# Patient Record
Sex: FEMALE | Race: WHITE | NOT HISPANIC OR LATINO | URBAN - METROPOLITAN AREA
[De-identification: names, ages, dates, MRNs, and addresses within clinical notes are randomized per-mention and may not be internally consistent; named-entity substitution may affect disease eponyms.]

---

## 2020-07-25 ENCOUNTER — TELEPHONE ENCOUNTER (OUTPATIENT)
Dept: URBAN - METROPOLITAN AREA CLINIC 13 | Facility: CLINIC | Age: 35
End: 2020-07-25

## 2020-07-25 RX ORDER — FERROUS SULFATE 325(65) MG
TAKE 1 TABLET DAILY TABLET ORAL
Qty: 90 | Refills: 3 | OUTPATIENT
End: 2019-08-27

## 2020-07-25 RX ORDER — PANTOPRAZOLE SODIUM 40 MG
TAKE 1 TABLET BY MOUTH ONCE DAILY TABLET, DELAYED RELEASE (ENTERIC COATED) ORAL
Qty: 30 | Refills: 5 | OUTPATIENT
End: 2019-04-04

## 2020-07-25 RX ORDER — TRAMADOL HYDROCHLORIDE 50 MG/1
TAKE 1 TABLET BY MOUTH EVERY 6 HOURS AS NEEDED FOR PAIN TABLET ORAL
Qty: 20 | Refills: 0 | OUTPATIENT
End: 2019-08-27

## 2020-07-25 RX ORDER — CYCLOBENZAPRINE HYDROCHLORIDE 10 MG/1
TAKE 1 TABLET AT BEDTIME AS NEEDED TABLET, FILM COATED ORAL
Refills: 0 | OUTPATIENT
Start: 2018-11-13 | End: 2019-08-27

## 2020-07-25 RX ORDER — LIDOCAINE 5 G/100G
APPLY TO AFFECTED AREAS AS DIRECTED OINTMENT TOPICAL
Refills: 0 | OUTPATIENT
Start: 2018-06-11 | End: 2020-03-12

## 2020-07-25 RX ORDER — TRAZODONE HYDROCHLORIDE 50 MG/1
TAKE 1 TABLET AT BEDTIME AS NEEDED FOR SLEEP TABLET ORAL
Refills: 0 | OUTPATIENT
End: 2019-01-21

## 2020-07-25 RX ORDER — OXYCODONE AND ACETAMINOPHEN 10; 325 MG/1; MG/1
TAKE 1 TO 2 TABLETS EVERY 4 TO 6 HOURS AS NEEDED FOR PAIN TABLET ORAL
Refills: 0 | OUTPATIENT
Start: 2018-12-11 | End: 2019-04-04

## 2020-07-26 ENCOUNTER — TELEPHONE ENCOUNTER (OUTPATIENT)
Dept: URBAN - METROPOLITAN AREA CLINIC 13 | Facility: CLINIC | Age: 35
End: 2020-07-26

## 2020-07-26 RX ORDER — CIPROFLOXACIN HYDROCHLORIDE 500 MG/1
TABLET, FILM COATED ORAL
Qty: 28 | Refills: 0 | Status: ACTIVE | COMMUNITY
Start: 2019-01-03

## 2020-07-26 RX ORDER — ALPRAZOLAM 1 MG/1
TK ONE T PO 1 HOUR PRIOR TO DENTAL APPT AND BRING REMAINING TS TO APPT TABLET ORAL
Qty: 3 | Refills: 0 | Status: ACTIVE | COMMUNITY
Start: 2019-05-02

## 2020-07-26 RX ORDER — BETAMETHASONE DIPROPIONATE 0.5 MG/G
APP EXT AA BID CREAM TOPICAL
Qty: 30 | Refills: 0 | Status: ACTIVE | COMMUNITY
Start: 2018-09-10

## 2020-07-26 RX ORDER — OXYCODONE AND ACETAMINOPHEN 5; 325 MG/1; MG/1
TABLET ORAL
Qty: 25 | Refills: 0 | Status: ACTIVE | COMMUNITY
Start: 2019-01-24

## 2020-07-26 RX ORDER — MULTIVITAMIN
TAKE 1 TABLET DAILY TABLET ORAL
Refills: 0 | Status: ACTIVE | COMMUNITY

## 2020-07-26 RX ORDER — NORETHINDRONE 0.35 MG/1
TABLET ORAL
Qty: 28 | Refills: 0 | Status: ACTIVE | COMMUNITY
Start: 2018-07-25

## 2020-07-26 RX ORDER — METRONIDAZOLE 500 MG/1
TABLET ORAL
Qty: 42 | Refills: 0 | Status: ACTIVE | COMMUNITY
Start: 2018-12-12

## 2020-07-26 RX ORDER — CIPROFLOXACIN HYDROCHLORIDE 500 MG/1
TABLET, FILM COATED ORAL
Qty: 28 | Refills: 0 | Status: ACTIVE | COMMUNITY
Start: 2018-11-20

## 2020-07-26 RX ORDER — FLUCONAZOLE 150 MG/1
TK 1 T PO QD TABLET ORAL
Qty: 7 | Refills: 0 | Status: ACTIVE | COMMUNITY
Start: 2018-10-23

## 2020-07-26 RX ORDER — ONDANSETRON 8 MG/1
TABLET, ORALLY DISINTEGRATING ORAL
Qty: 40 | Refills: 0 | Status: ACTIVE | COMMUNITY
Start: 2018-12-12

## 2020-07-26 RX ORDER — ESCITALOPRAM 10 MG/1
TAKE 1 TABLET DAILY TABLET, FILM COATED ORAL
Refills: 0 | Status: ACTIVE | COMMUNITY
Start: 2019-08-26

## 2020-07-26 RX ORDER — PANTOPRAZOLE SODIUM 40 MG/1
TABLET, DELAYED RELEASE ORAL
Qty: 30 | Refills: 0 | Status: ACTIVE | COMMUNITY
Start: 2018-07-16

## 2020-07-26 RX ORDER — SULFAMETHOXAZOLE AND TRIMETHOPRIM 800; 160 MG/1; MG/1
TK 1 T PO BID TABLET ORAL
Qty: 20 | Refills: 0 | Status: ACTIVE | COMMUNITY
Start: 2020-01-17

## 2020-07-26 RX ORDER — CIPROFLOXACIN HYDROCHLORIDE 500 MG/1
TABLET, FILM COATED ORAL
Qty: 28 | Refills: 0 | Status: ACTIVE | COMMUNITY
Start: 2019-03-26

## 2020-07-26 RX ORDER — PANTOPRAZOLE SODIUM 40 MG/1
TAKE 1 TABLET 30 MINUTES BEFORE BREAKFAST DAILY TABLET, DELAYED RELEASE ORAL
Qty: 90 | Refills: 3 | Status: ACTIVE | COMMUNITY
Start: 2020-03-12

## 2020-07-26 RX ORDER — TRAMADOL HYDROCHLORIDE 50 MG/1
TK 1 T PO  Q 6 H TABLET ORAL
Qty: 50 | Refills: 0 | Status: ACTIVE | COMMUNITY
Start: 2018-11-20

## 2020-07-26 RX ORDER — SULFAMETHOXAZOLE AND TRIMETHOPRIM 800; 160 MG/1; MG/1
TK 1 T PO  BID TABLET ORAL
Qty: 14 | Refills: 0 | Status: ACTIVE | COMMUNITY
Start: 2018-10-29

## 2020-07-26 RX ORDER — ONDANSETRON 4 MG/1
TABLET, ORALLY DISINTEGRATING ORAL
Qty: 40 | Refills: 0 | Status: ACTIVE | COMMUNITY
Start: 2019-02-12

## 2020-07-26 RX ORDER — LEVOMEFOLATE CALCIUM 7.5 MG
TAKE 1 TABLET DAILY TABLET ORAL
Refills: 0 | Status: ACTIVE | COMMUNITY

## 2020-07-26 RX ORDER — METRONIDAZOLE 500 MG/1
TABLET ORAL
Qty: 42 | Refills: 0 | Status: ACTIVE | COMMUNITY
Start: 2018-11-20

## 2020-07-26 RX ORDER — AZATHIOPRINE 50 1/1
TAKE 1 TABLET DAILY TABLET ORAL
Qty: 90 | Refills: 2 | Status: ACTIVE | COMMUNITY
Start: 2019-04-04

## 2020-07-26 RX ORDER — ADALIMUMAB 40MG/0.4ML
KIT SUBCUTANEOUS
Qty: 2 | Refills: 0 | Status: ACTIVE | COMMUNITY
Start: 2019-02-08

## 2020-07-26 RX ORDER — SERTRALINE 25 MG/1
TK 1 T PO QD TABLET, FILM COATED ORAL
Qty: 30 | Refills: 0 | Status: ACTIVE | COMMUNITY
Start: 2019-06-18

## 2020-07-26 RX ORDER — METRONIDAZOLE 7.5 MG/G
IVB  FOR 5 NIGHTS GEL VAGINAL
Qty: 70 | Refills: 0 | Status: ACTIVE | COMMUNITY
Start: 2018-10-29

## 2020-07-26 RX ORDER — SERTRALINE 50 MG/1
TABLET, FILM COATED ORAL
Qty: 90 | Refills: 0 | Status: ACTIVE | COMMUNITY
Start: 2019-07-31

## 2020-07-26 RX ORDER — ALPRAZOLAM 0.5 MG/1
TABLET ORAL
Qty: 30 | Refills: 0 | Status: ACTIVE | COMMUNITY
Start: 2019-06-18

## 2020-07-26 RX ORDER — GABAPENTIN 600 MG/1
TAKE 1 TABLET 3 TIMES DAILY TABLET, FILM COATED ORAL
Qty: 90 | Refills: 3 | Status: ACTIVE | COMMUNITY
Start: 2019-11-12

## 2020-07-26 RX ORDER — METRONIDAZOLE 500 MG/1
TK 1 T PO  BID TABLET ORAL
Qty: 42 | Refills: 0 | Status: ACTIVE | COMMUNITY
Start: 2019-01-03

## 2020-07-26 RX ORDER — DESONIDE 0.5 MG/G
OINTMENT TOPICAL
Qty: 60 | Refills: 0 | Status: ACTIVE | COMMUNITY
Start: 2019-07-12

## 2020-07-26 RX ORDER — ADALIMUMAB 40MG/0.4ML
MAINTENANCE DOSE: ONE 40 MG SQ EVERY OTHER WEEK KIT SUBCUTANEOUS
Qty: 2 | Refills: 6 | Status: ACTIVE | COMMUNITY
Start: 2019-02-06

## 2020-07-26 RX ORDER — HYDROCODONE BITARTRATE AND ACETAMINOPHEN 5; 325 MG/1; MG/1
TABLET ORAL
Qty: 16 | Refills: 0 | Status: ACTIVE | COMMUNITY
Start: 2019-05-02

## 2020-07-26 RX ORDER — CIPROFLOXACIN HYDROCHLORIDE 500 MG/1
TABLET, FILM COATED ORAL
Qty: 28 | Refills: 0 | Status: ACTIVE | COMMUNITY
Start: 2018-12-12

## 2020-07-26 RX ORDER — AMOXICILLIN 500 MG/1
CAPSULE ORAL
Qty: 21 | Refills: 0 | Status: ACTIVE | COMMUNITY
Start: 2019-05-02

## 2020-07-26 RX ORDER — LIDOCAINE 5 G/100G
APP TO PERIANAL REGION BID PRN OINTMENT TOPICAL
Qty: 30 | Refills: 0 | Status: ACTIVE | COMMUNITY
Start: 2018-06-11

## 2020-07-26 RX ORDER — CYCLOBENZAPRINE HYDROCHLORIDE 10 MG/1
TK 1 T PO TID TABLET, FILM COATED ORAL
Qty: 90 | Refills: 0 | Status: ACTIVE | COMMUNITY
Start: 2018-11-13

## 2020-08-07 ENCOUNTER — TELEPHONE ENCOUNTER (OUTPATIENT)
Dept: URBAN - METROPOLITAN AREA CLINIC 113 | Facility: CLINIC | Age: 35
End: 2020-08-07

## 2020-10-06 ENCOUNTER — ERX REFILL RESPONSE (OUTPATIENT)
Age: 35
End: 2020-10-06

## 2020-10-06 ENCOUNTER — TELEPHONE ENCOUNTER (OUTPATIENT)
Dept: URBAN - METROPOLITAN AREA CLINIC 113 | Facility: CLINIC | Age: 35
End: 2020-10-06

## 2020-10-06 PROBLEM — 444548001: Status: ACTIVE | Noted: 2020-10-06

## 2020-10-06 RX ORDER — AZATHIOPRINE 50 1/1
TAKE 1 TABLET BY MOUTH DAILY TABLET ORAL
Qty: 90 | Refills: 0

## 2023-03-21 ENCOUNTER — WEB ENCOUNTER (OUTPATIENT)
Dept: URBAN - METROPOLITAN AREA CLINIC 113 | Facility: CLINIC | Age: 38
End: 2023-03-21

## 2023-03-24 ENCOUNTER — OFFICE VISIT (OUTPATIENT)
Dept: URBAN - METROPOLITAN AREA CLINIC 113 | Facility: CLINIC | Age: 38
End: 2023-03-24

## 2024-01-18 ENCOUNTER — TELEPHONE ENCOUNTER (OUTPATIENT)
Dept: URBAN - METROPOLITAN AREA CLINIC 113 | Facility: CLINIC | Age: 39
End: 2024-01-18

## 2024-01-18 ENCOUNTER — OFFICE VISIT (OUTPATIENT)
Dept: URBAN - METROPOLITAN AREA CLINIC 113 | Facility: CLINIC | Age: 39
End: 2024-01-18
Payer: COMMERCIAL

## 2024-01-18 VITALS — WEIGHT: 140 LBS | RESPIRATION RATE: 16 BRPM | BODY MASS INDEX: 23.9 KG/M2 | HEIGHT: 64 IN | TEMPERATURE: 98.4 F

## 2024-01-18 DIAGNOSIS — R11.0 CHRONIC NAUSEA: ICD-10-CM

## 2024-01-18 DIAGNOSIS — K50.118 CROHN'S DISEASE OF COLON WITH OTHER COMPLICATION: ICD-10-CM

## 2024-01-18 PROBLEM — 50440006: Status: ACTIVE | Noted: 2024-01-18

## 2024-01-18 PROCEDURE — 99204 OFFICE O/P NEW MOD 45 MIN: CPT

## 2024-01-18 RX ORDER — AZATHIOPRINE 50 1/1
TAKE 1 TABLET BY MOUTH DAILY TABLET ORAL
Qty: 90 | Refills: 0 | Status: ON HOLD | COMMUNITY

## 2024-01-18 RX ORDER — USTEKINUMAB 90 MG/ML
AS DIRECTED INJECTION, SOLUTION SUBCUTANEOUS
Qty: 6 | Refills: 3
Start: 2024-01-18 | End: 2025-01-12

## 2024-01-18 RX ORDER — LEVOMEFOLATE CALCIUM 15 MG
TAKE 1 TABLET DAILY TABLET ORAL DAILY
Refills: 0 | Status: ACTIVE | COMMUNITY

## 2024-01-18 RX ORDER — ALPRAZOLAM 1 MG/1
TK ONE T PO 1 HOUR PRIOR TO DENTAL APPT AND BRING REMAINING TS TO APPT TABLET ORAL
Qty: 3 | Refills: 0 | Status: ON HOLD | COMMUNITY
Start: 2019-05-02

## 2024-01-18 RX ORDER — FLUCONAZOLE 150 MG/1
TK 1 T PO QD TABLET ORAL
Qty: 7 | Refills: 0 | Status: ON HOLD | COMMUNITY
Start: 2018-10-23

## 2024-01-18 RX ORDER — METRONIDAZOLE 7.5 MG/G
IVB  FOR 5 NIGHTS GEL VAGINAL
Qty: 70 | Refills: 0 | Status: ON HOLD | COMMUNITY
Start: 2018-10-29

## 2024-01-18 RX ORDER — ESCITALOPRAM OXALATE 20 MG/1
TAKE 1 TABLET DAILY TABLET ORAL DAILY
Refills: 0 | Status: ACTIVE | COMMUNITY
Start: 2019-08-26

## 2024-01-18 RX ORDER — SULFAMETHOXAZOLE AND TRIMETHOPRIM 800; 160 MG/1; MG/1
TK 1 T PO  BID TABLET ORAL
Qty: 14 | Refills: 0 | Status: ON HOLD | COMMUNITY
Start: 2018-10-29

## 2024-01-18 RX ORDER — SERTRALINE 50 MG/1
TABLET, FILM COATED ORAL
Qty: 90 | Refills: 0 | Status: ON HOLD | COMMUNITY
Start: 2019-07-31

## 2024-01-18 RX ORDER — TRAMADOL HYDROCHLORIDE 50 MG/1
TK 1 T PO  Q 6 H TABLET ORAL
Qty: 50 | Refills: 0 | Status: ON HOLD | COMMUNITY
Start: 2018-11-20

## 2024-01-18 RX ORDER — ONDANSETRON 8 MG/1
TABLET, ORALLY DISINTEGRATING ORAL
Qty: 40 | Refills: 0 | Status: ACTIVE | COMMUNITY
Start: 2018-12-12

## 2024-01-18 RX ORDER — ONDANSETRON 4 MG/1
TABLET, ORALLY DISINTEGRATING ORAL
Qty: 40 | Refills: 0 | Status: ON HOLD | COMMUNITY
Start: 2019-02-12

## 2024-01-18 RX ORDER — KRILL/OM-3/DHA/EPA/PHOSPHO/AST 1000-230MG
1 TABLET CAPSULE ORAL ONCE A DAY
Status: ACTIVE | COMMUNITY

## 2024-01-18 RX ORDER — ONDANSETRON 8 MG/1
1 TABLET ON THE TONGUE AND ALLOW TO DISSOLVE TABLET, ORALLY DISINTEGRATING ORAL
Qty: 90 | Refills: 1 | OUTPATIENT
Start: 2024-01-18

## 2024-01-18 RX ORDER — PANTOPRAZOLE SODIUM 40 MG/1
TABLET, DELAYED RELEASE ORAL
Qty: 30 | Refills: 0 | Status: ON HOLD | COMMUNITY
Start: 2018-07-16

## 2024-01-18 RX ORDER — NORETHINDRONE 0.35 MG/1
TABLET ORAL
Qty: 28 | Refills: 0 | Status: ON HOLD | COMMUNITY
Start: 2018-07-25

## 2024-01-18 RX ORDER — USTEKINUMAB 90 MG/ML
AS DIRECTED INJECTION, SOLUTION SUBCUTANEOUS
Qty: 6 | Refills: 3 | OUTPATIENT
Start: 2024-01-18 | End: 2025-01-12

## 2024-01-18 RX ORDER — MULTIVITAMIN
TAKE 1 TABLET DAILY TABLET ORAL
Refills: 0 | Status: ON HOLD | COMMUNITY

## 2024-01-18 RX ORDER — SERTRALINE 25 MG/1
TK 1 T PO QD TABLET, FILM COATED ORAL
Qty: 30 | Refills: 0 | Status: ON HOLD | COMMUNITY
Start: 2019-06-18

## 2024-01-18 RX ORDER — CYCLOBENZAPRINE HYDROCHLORIDE 10 MG/1
TK 1 T PO TID TABLET, FILM COATED ORAL
Qty: 90 | Refills: 0 | Status: ON HOLD | COMMUNITY
Start: 2018-11-13

## 2024-01-18 RX ORDER — ADALIMUMAB 40MG/0.4ML
MAINTENANCE DOSE: ONE 40 MG SQ EVERY OTHER WEEK KIT SUBCUTANEOUS
Qty: 2 | Refills: 6 | Status: ON HOLD | COMMUNITY
Start: 2019-02-06

## 2024-01-18 RX ORDER — METRONIDAZOLE 500 MG/1
TABLET ORAL
Qty: 42 | Refills: 0 | Status: ON HOLD | COMMUNITY
Start: 2018-11-20

## 2024-01-18 RX ORDER — HYDROCODONE BITARTRATE AND ACETAMINOPHEN 5; 325 MG/1; MG/1
TABLET ORAL
Qty: 16 | Refills: 0 | Status: ON HOLD | COMMUNITY
Start: 2019-05-02

## 2024-01-18 RX ORDER — GABAPENTIN 600 MG/1
TAKE 1 TABLET 3 TIMES DAILY TABLET, FILM COATED ORAL
Qty: 90 | Refills: 3 | Status: ON HOLD | COMMUNITY
Start: 2019-11-12

## 2024-01-18 RX ORDER — USTEKINUMAB 90 MG/ML
AS DIRECTED INJECTION, SOLUTION SUBCUTANEOUS
Status: ACTIVE | COMMUNITY

## 2024-01-18 RX ORDER — LIDOCAINE 5 G/100G
APP TO PERIANAL REGION BID PRN OINTMENT TOPICAL
Qty: 30 | Refills: 0 | Status: ON HOLD | COMMUNITY
Start: 2018-06-11

## 2024-01-18 RX ORDER — CIPROFLOXACIN HYDROCHLORIDE 500 MG/1
TABLET, FILM COATED ORAL
Qty: 28 | Refills: 0 | Status: ON HOLD | COMMUNITY
Start: 2018-11-20

## 2024-01-18 RX ORDER — AMOXICILLIN 500 MG/1
CAPSULE ORAL
Qty: 21 | Refills: 0 | Status: ON HOLD | COMMUNITY
Start: 2019-05-02

## 2024-01-18 RX ORDER — DESONIDE 0.5 MG/G
OINTMENT TOPICAL
Qty: 60 | Refills: 0 | Status: ON HOLD | COMMUNITY
Start: 2019-07-12

## 2024-01-18 RX ORDER — BETAMETHASONE DIPROPIONATE 0.5 MG/G
APP EXT AA BID CREAM TOPICAL
Qty: 30 | Refills: 0 | Status: ON HOLD | COMMUNITY
Start: 2018-09-10

## 2024-01-18 RX ORDER — ALPRAZOLAM 0.5 MG/1
TABLET ORAL
Qty: 30 | Refills: 0 | Status: ACTIVE | COMMUNITY
Start: 2019-06-18

## 2024-01-18 RX ORDER — OXYCODONE AND ACETAMINOPHEN 5; 325 MG/1; MG/1
TABLET ORAL
Qty: 25 | Refills: 0 | Status: ON HOLD | COMMUNITY
Start: 2019-01-24

## 2024-01-18 NOTE — HPI-TODAY'S VISIT:
38-year-old female with a history of Crohn's colitis status post total proctocolectomy with J-pouch formation with subsequent reanastomosis, complicated by fistula and cuff-itis presents for long interval follow-up.   She was last seen in 2020. She was doing well on Humira 40 mg every other week.  Her rectal/pelvic pain had improved with gabapentin 600 mg at bedtime and use of a heating pad.  She had undergone CT imaging for evaluation of possible hernia which demonstrated gastric thickening suggestive of gastritis and hepatic steatosis.  She reported upper abdominal pain after meals/nausea with improvement following resumption of pantoprazole.  She was recommended labs and an EGD.  Unfortunately she was out of network and would be self-pay.  She was provided recommendations for other gastroenterologists in the area in network with her insurance.  She had been followed by Dr. Torres in the interim until she moved to [place]. She was last followed by Dr. Curtis Brown in [place]. She had been on Humira weekly prior to Dr. Brown switching her to Stelara in July 2023. Her last pouchoscopy in July 2023 showed active inflammation and ulcers at the suture line and around. 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 She was recommended a repeat pouchoscopy in 6 months to assess response to Stelara. Otherwise, her colorectal surgeon recommends yearly pouchoscopy for surveillance. 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 She does feel better on Stelara compared to Humira. Her next Stelara dose is due around 2/5. She obtains this through [facility]. She has not had any BRBPR. Her chronic pelvic pain has improved, She is still averaging 6-12 stools per day. The consistency ranges from loose to formed. Denies abdominal pain typically. SHe may have a pinching sensation behind her prior ileostomy site. She attributes this to adhesions. She is nauseous most of the time. Denies vomiting. This preceded even her Crohn's diagnosis. SHe utilizes Zofran 8 mg about once every other week. She needs a refill.   She had a stroke prior to her proctocolectomy. She had 12 infarcts total noted on her brain MRI. Workup was negative for hematologic or cardiac etiologies.  It was determined her marked systemic inflammation caused a hypercoagulable state. She has not had any CVAs since then.

## 2024-01-18 NOTE — HPI-OTHER HISTORIES
Pouchoscopy 10/17/2019:A few ulcers in the ileal reservoir and in the ileoanal pouch, biopsied. Pathology revealed chronic active ileitis with chronic ulcer and granulation tissue, consistent with chronic active pouchitis.  Pelvic MRI 10/29/2019:Small blind pouch, extending behind the rectum which is felt to represent an end-to-side anastomosis of small bowel with rectum. This is not felt to represent abscess or fistula. Correlation with surgical history helpful. Fallopian tube recurrent upon itself extending towards the left side of the sacrum. Small left ovarian cyst.

## 2024-02-06 ENCOUNTER — POU (OUTPATIENT)
Dept: URBAN - METROPOLITAN AREA MEDICAL CENTER 19 | Facility: MEDICAL CENTER | Age: 39
End: 2024-02-06
Payer: COMMERCIAL

## 2024-02-06 DIAGNOSIS — Z90.49 ABSENCE OF GALLBLADDER: ICD-10-CM

## 2024-02-06 DIAGNOSIS — K63.89 APPENDICITIS EPIPLOICA: ICD-10-CM

## 2024-02-06 PROCEDURE — 44385 ENDOSCOPY OF BOWEL POUCH: CPT | Performed by: INTERNAL MEDICINE

## 2024-02-06 RX ORDER — BETAMETHASONE DIPROPIONATE 0.5 MG/G
APP EXT AA BID CREAM TOPICAL
Qty: 30 | Refills: 0 | Status: ON HOLD | COMMUNITY
Start: 2018-09-10

## 2024-02-06 RX ORDER — SERTRALINE 25 MG/1
TK 1 T PO QD TABLET, FILM COATED ORAL
Qty: 30 | Refills: 0 | Status: ON HOLD | COMMUNITY
Start: 2019-06-18

## 2024-02-06 RX ORDER — MULTIVITAMIN
TAKE 1 TABLET DAILY TABLET ORAL
Refills: 0 | Status: ON HOLD | COMMUNITY

## 2024-02-06 RX ORDER — ADALIMUMAB 40MG/0.4ML
MAINTENANCE DOSE: ONE 40 MG SQ EVERY OTHER WEEK KIT SUBCUTANEOUS
Qty: 2 | Refills: 6 | Status: ON HOLD | COMMUNITY
Start: 2019-02-06

## 2024-02-06 RX ORDER — OXYCODONE AND ACETAMINOPHEN 5; 325 MG/1; MG/1
TABLET ORAL
Qty: 25 | Refills: 0 | Status: ON HOLD | COMMUNITY
Start: 2019-01-24

## 2024-02-06 RX ORDER — AZATHIOPRINE 50 1/1
TAKE 1 TABLET BY MOUTH DAILY TABLET ORAL
Qty: 90 | Refills: 0 | Status: ON HOLD | COMMUNITY

## 2024-02-06 RX ORDER — ONDANSETRON 8 MG/1
1 TABLET ON THE TONGUE AND ALLOW TO DISSOLVE TABLET, ORALLY DISINTEGRATING ORAL
Qty: 90 | Refills: 1 | Status: ACTIVE | COMMUNITY
Start: 2024-01-18

## 2024-02-06 RX ORDER — METRONIDAZOLE 500 MG/1
TABLET ORAL
Qty: 42 | Refills: 0 | Status: ON HOLD | COMMUNITY
Start: 2018-11-20

## 2024-02-06 RX ORDER — KRILL/OM-3/DHA/EPA/PHOSPHO/AST 1000-230MG
1 TABLET CAPSULE ORAL ONCE A DAY
Status: ACTIVE | COMMUNITY

## 2024-02-06 RX ORDER — LEVOMEFOLATE CALCIUM 15 MG
TAKE 1 TABLET DAILY TABLET ORAL DAILY
Refills: 0 | Status: ACTIVE | COMMUNITY

## 2024-02-06 RX ORDER — NORETHINDRONE 0.35 MG/1
TABLET ORAL
Qty: 28 | Refills: 0 | Status: ON HOLD | COMMUNITY
Start: 2018-07-25

## 2024-02-06 RX ORDER — ALPRAZOLAM 0.5 MG/1
TABLET ORAL
Qty: 30 | Refills: 0 | Status: ACTIVE | COMMUNITY
Start: 2019-06-18

## 2024-02-06 RX ORDER — PANTOPRAZOLE SODIUM 40 MG/1
TABLET, DELAYED RELEASE ORAL
Qty: 30 | Refills: 0 | Status: ON HOLD | COMMUNITY
Start: 2018-07-16

## 2024-02-06 RX ORDER — USTEKINUMAB 90 MG/ML
AS DIRECTED INJECTION, SOLUTION SUBCUTANEOUS
Qty: 6 | Refills: 3 | Status: ACTIVE | COMMUNITY
Start: 2024-01-18 | End: 2025-01-12

## 2024-02-06 RX ORDER — CYCLOBENZAPRINE HYDROCHLORIDE 10 MG/1
TK 1 T PO TID TABLET, FILM COATED ORAL
Qty: 90 | Refills: 0 | Status: ON HOLD | COMMUNITY
Start: 2018-11-13

## 2024-02-06 RX ORDER — SULFAMETHOXAZOLE AND TRIMETHOPRIM 800; 160 MG/1; MG/1
TK 1 T PO  BID TABLET ORAL
Qty: 14 | Refills: 0 | Status: ON HOLD | COMMUNITY
Start: 2018-10-29

## 2024-02-06 RX ORDER — DESONIDE 0.5 MG/G
OINTMENT TOPICAL
Qty: 60 | Refills: 0 | Status: ON HOLD | COMMUNITY
Start: 2019-07-12

## 2024-02-06 RX ORDER — LIDOCAINE 5 G/100G
APP TO PERIANAL REGION BID PRN OINTMENT TOPICAL
Qty: 30 | Refills: 0 | Status: ON HOLD | COMMUNITY
Start: 2018-06-11

## 2024-02-06 RX ORDER — FLUCONAZOLE 150 MG/1
TK 1 T PO QD TABLET ORAL
Qty: 7 | Refills: 0 | Status: ON HOLD | COMMUNITY
Start: 2018-10-23

## 2024-02-06 RX ORDER — ONDANSETRON 8 MG/1
TABLET, ORALLY DISINTEGRATING ORAL
Qty: 40 | Refills: 0 | Status: ACTIVE | COMMUNITY
Start: 2018-12-12

## 2024-02-06 RX ORDER — CIPROFLOXACIN HYDROCHLORIDE 500 MG/1
TABLET, FILM COATED ORAL
Qty: 28 | Refills: 0 | Status: ON HOLD | COMMUNITY
Start: 2018-11-20

## 2024-02-06 RX ORDER — GABAPENTIN 600 MG/1
TAKE 1 TABLET 3 TIMES DAILY TABLET, FILM COATED ORAL
Qty: 90 | Refills: 3 | Status: ON HOLD | COMMUNITY
Start: 2019-11-12

## 2024-02-06 RX ORDER — ONDANSETRON 4 MG/1
TABLET, ORALLY DISINTEGRATING ORAL
Qty: 40 | Refills: 0 | Status: ON HOLD | COMMUNITY
Start: 2019-02-12

## 2024-02-06 RX ORDER — ALPRAZOLAM 1 MG/1
TK ONE T PO 1 HOUR PRIOR TO DENTAL APPT AND BRING REMAINING TS TO APPT TABLET ORAL
Qty: 3 | Refills: 0 | Status: ON HOLD | COMMUNITY
Start: 2019-05-02

## 2024-02-06 RX ORDER — AMOXICILLIN 500 MG/1
CAPSULE ORAL
Qty: 21 | Refills: 0 | Status: ON HOLD | COMMUNITY
Start: 2019-05-02

## 2024-02-06 RX ORDER — TRAMADOL HYDROCHLORIDE 50 MG/1
TK 1 T PO  Q 6 H TABLET ORAL
Qty: 50 | Refills: 0 | Status: ON HOLD | COMMUNITY
Start: 2018-11-20

## 2024-02-06 RX ORDER — HYDROCODONE BITARTRATE AND ACETAMINOPHEN 5; 325 MG/1; MG/1
TABLET ORAL
Qty: 16 | Refills: 0 | Status: ON HOLD | COMMUNITY
Start: 2019-05-02

## 2024-02-06 RX ORDER — METRONIDAZOLE 7.5 MG/G
IVB  FOR 5 NIGHTS GEL VAGINAL
Qty: 70 | Refills: 0 | Status: ON HOLD | COMMUNITY
Start: 2018-10-29

## 2024-02-06 RX ORDER — USTEKINUMAB 90 MG/ML
AS DIRECTED INJECTION, SOLUTION SUBCUTANEOUS
Status: ACTIVE | COMMUNITY

## 2024-02-06 RX ORDER — ESCITALOPRAM OXALATE 20 MG/1
TAKE 1 TABLET DAILY TABLET ORAL DAILY
Refills: 0 | Status: ACTIVE | COMMUNITY
Start: 2019-08-26

## 2024-02-06 RX ORDER — SERTRALINE 50 MG/1
TABLET, FILM COATED ORAL
Qty: 90 | Refills: 0 | Status: ON HOLD | COMMUNITY
Start: 2019-07-31

## 2024-02-29 ENCOUNTER — OV EP (OUTPATIENT)
Dept: URBAN - METROPOLITAN AREA CLINIC 107 | Facility: CLINIC | Age: 39
End: 2024-02-29

## 2024-02-29 RX ORDER — GABAPENTIN 600 MG/1
TAKE 1 TABLET 3 TIMES DAILY TABLET, FILM COATED ORAL
Qty: 90 | Refills: 3 | Status: ON HOLD | COMMUNITY
Start: 2019-11-12

## 2024-02-29 RX ORDER — KRILL/OM-3/DHA/EPA/PHOSPHO/AST 1000-230MG
1 TABLET CAPSULE ORAL ONCE A DAY
Status: ACTIVE | COMMUNITY

## 2024-02-29 RX ORDER — SERTRALINE 25 MG/1
TK 1 T PO QD TABLET, FILM COATED ORAL
Qty: 30 | Refills: 0 | Status: ON HOLD | COMMUNITY
Start: 2019-06-18

## 2024-02-29 RX ORDER — ONDANSETRON 8 MG/1
1 TABLET ON THE TONGUE AND ALLOW TO DISSOLVE TABLET, ORALLY DISINTEGRATING ORAL
Qty: 90 | Refills: 1 | Status: ACTIVE | COMMUNITY
Start: 2024-01-18

## 2024-02-29 RX ORDER — USTEKINUMAB 90 MG/ML
AS DIRECTED INJECTION, SOLUTION SUBCUTANEOUS
Status: ACTIVE | COMMUNITY

## 2024-02-29 RX ORDER — ONDANSETRON 8 MG/1
TABLET, ORALLY DISINTEGRATING ORAL
Qty: 40 | Refills: 0 | Status: ACTIVE | COMMUNITY
Start: 2018-12-12

## 2024-02-29 RX ORDER — TRAMADOL HYDROCHLORIDE 50 MG/1
TK 1 T PO  Q 6 H TABLET ORAL
Qty: 50 | Refills: 0 | Status: ON HOLD | COMMUNITY
Start: 2018-11-20

## 2024-02-29 RX ORDER — LIDOCAINE 5 G/100G
APP TO PERIANAL REGION BID PRN OINTMENT TOPICAL
Qty: 30 | Refills: 0 | Status: ON HOLD | COMMUNITY
Start: 2018-06-11

## 2024-02-29 RX ORDER — BETAMETHASONE DIPROPIONATE 0.5 MG/G
APP EXT AA BID CREAM TOPICAL
Qty: 30 | Refills: 0 | Status: ON HOLD | COMMUNITY
Start: 2018-09-10

## 2024-02-29 RX ORDER — CIPROFLOXACIN HYDROCHLORIDE 500 MG/1
TABLET, FILM COATED ORAL
Qty: 28 | Refills: 0 | Status: ON HOLD | COMMUNITY
Start: 2018-11-20

## 2024-02-29 RX ORDER — ESCITALOPRAM OXALATE 20 MG/1
TAKE 1 TABLET DAILY TABLET ORAL DAILY
Refills: 0 | Status: ACTIVE | COMMUNITY
Start: 2019-08-26

## 2024-02-29 RX ORDER — ALPRAZOLAM 0.5 MG/1
TABLET ORAL
Qty: 30 | Refills: 0 | Status: ACTIVE | COMMUNITY
Start: 2019-06-18

## 2024-02-29 RX ORDER — USTEKINUMAB 90 MG/ML
AS DIRECTED INJECTION, SOLUTION SUBCUTANEOUS
Qty: 6 | Refills: 3 | Status: ACTIVE | COMMUNITY
Start: 2024-01-18 | End: 2025-01-12

## 2024-02-29 RX ORDER — AMOXICILLIN 500 MG/1
CAPSULE ORAL
Qty: 21 | Refills: 0 | Status: ON HOLD | COMMUNITY
Start: 2019-05-02

## 2024-02-29 RX ORDER — METRONIDAZOLE 7.5 MG/G
IVB  FOR 5 NIGHTS GEL VAGINAL
Qty: 70 | Refills: 0 | Status: ON HOLD | COMMUNITY
Start: 2018-10-29

## 2024-02-29 RX ORDER — LEVOMEFOLATE CALCIUM 15 MG
TAKE 1 TABLET DAILY TABLET ORAL DAILY
Refills: 0 | Status: ACTIVE | COMMUNITY

## 2024-02-29 RX ORDER — ONDANSETRON 4 MG/1
TABLET, ORALLY DISINTEGRATING ORAL
Qty: 40 | Refills: 0 | Status: ON HOLD | COMMUNITY
Start: 2019-02-12

## 2024-02-29 RX ORDER — FLUCONAZOLE 150 MG/1
TK 1 T PO QD TABLET ORAL
Qty: 7 | Refills: 0 | Status: ON HOLD | COMMUNITY
Start: 2018-10-23

## 2024-02-29 RX ORDER — ALPRAZOLAM 1 MG/1
TK ONE T PO 1 HOUR PRIOR TO DENTAL APPT AND BRING REMAINING TS TO APPT TABLET ORAL
Qty: 3 | Refills: 0 | Status: ON HOLD | COMMUNITY
Start: 2019-05-02

## 2024-02-29 RX ORDER — AZATHIOPRINE 50 1/1
TAKE 1 TABLET BY MOUTH DAILY TABLET ORAL
Qty: 90 | Refills: 0 | Status: ON HOLD | COMMUNITY

## 2024-02-29 RX ORDER — METRONIDAZOLE 500 MG/1
TABLET ORAL
Qty: 42 | Refills: 0 | Status: ON HOLD | COMMUNITY
Start: 2018-11-20

## 2024-02-29 RX ORDER — SULFAMETHOXAZOLE AND TRIMETHOPRIM 800; 160 MG/1; MG/1
TK 1 T PO  BID TABLET ORAL
Qty: 14 | Refills: 0 | Status: ON HOLD | COMMUNITY
Start: 2018-10-29

## 2024-02-29 RX ORDER — HYDROCODONE BITARTRATE AND ACETAMINOPHEN 5; 325 MG/1; MG/1
TABLET ORAL
Qty: 16 | Refills: 0 | Status: ON HOLD | COMMUNITY
Start: 2019-05-02

## 2024-02-29 RX ORDER — OXYCODONE AND ACETAMINOPHEN 5; 325 MG/1; MG/1
TABLET ORAL
Qty: 25 | Refills: 0 | Status: ON HOLD | COMMUNITY
Start: 2019-01-24

## 2024-02-29 RX ORDER — NORETHINDRONE 0.35 MG/1
TABLET ORAL
Qty: 28 | Refills: 0 | Status: ON HOLD | COMMUNITY
Start: 2018-07-25

## 2024-02-29 RX ORDER — PANTOPRAZOLE SODIUM 40 MG/1
TABLET, DELAYED RELEASE ORAL
Qty: 30 | Refills: 0 | Status: ON HOLD | COMMUNITY
Start: 2018-07-16

## 2024-02-29 RX ORDER — MULTIVITAMIN
TAKE 1 TABLET DAILY TABLET ORAL
Refills: 0 | Status: ON HOLD | COMMUNITY

## 2024-02-29 RX ORDER — CYCLOBENZAPRINE HYDROCHLORIDE 10 MG/1
TK 1 T PO TID TABLET, FILM COATED ORAL
Qty: 90 | Refills: 0 | Status: ON HOLD | COMMUNITY
Start: 2018-11-13

## 2024-02-29 RX ORDER — SERTRALINE 50 MG/1
TABLET, FILM COATED ORAL
Qty: 90 | Refills: 0 | Status: ON HOLD | COMMUNITY
Start: 2019-07-31

## 2024-02-29 RX ORDER — ADALIMUMAB 40MG/0.4ML
MAINTENANCE DOSE: ONE 40 MG SQ EVERY OTHER WEEK KIT SUBCUTANEOUS
Qty: 2 | Refills: 6 | Status: ON HOLD | COMMUNITY
Start: 2019-02-06

## 2024-02-29 RX ORDER — DESONIDE 0.5 MG/G
OINTMENT TOPICAL
Qty: 60 | Refills: 0 | Status: ON HOLD | COMMUNITY
Start: 2019-07-12

## 2024-02-29 NOTE — HPI-TODAY'S VISIT:
38-year-old female with a history of Crohn's colitis status post total proctocolectomy with J-pouch formation with subsequent reanastomosis, complicated by fistula and cuff-itis presents for long interval follow-up.   She was last seen in 2020. She was doing well on Humira 40 mg every other week.  Her rectal/pelvic pain had improved with gabapentin 600 mg at bedtime and use of a heating pad.  She had undergone CT imaging for evaluation of possible hernia which demonstrated gastric thickening suggestive of gastritis and hepatic steatosis.  She reported upper abdominal pain after meals/nausea with improvement following resumption of pantoprazole.  She was recommended labs and an EGD.  Unfortunately she was out of network and would be self-pay.  She was provided recommendations for other gastroenterologists in the area in network with her insurance.  She had been followed by Dr. Torres in the interim until she moved to [place]. She was last followed by Dr. Curtis Brown in [place]. She had been on Humira weekly prior to Dr. Brown switching her to Stelara in July 2023. Her last pouchoscopy in July 2023 showed active inflammation and ulcers at the suture line and around. 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 She was recommended a repeat pouchoscopy in 6 months to assess response to Stelara. Otherwise, her colorectal surgeon recommends yearly pouchoscopy for surveillance. 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 She does feel better on Stelara compared to Humira. Her next Stelara dose is due around 2/5. She obtains this through [facility]. She has not had any BRBPR. Her chronic pelvic pain has improved, She is still averaging 6-12 stools per day. The consistency ranges from loose to formed. Denies abdominal pain typically. SHe may have a pinching sensation behind her prior ileostomy site. She attributes this to adhesions. She is nauseous most of the time. Denies vomiting. This preceded even her Crohn's diagnosis. SHe utilizes Zofran 8 mg about once every other week. She needs a refill.   She had a stroke prior to her proctocolectomy. She had 12 infarcts total noted on her brain MRI. Workup was negative for hematologic or cardiac etiologies.  It was determined her marked systemic inflammation caused a hypercoagulable state. She has not had any CVAs since then. Interval history, 2/29/2024.  Pouchoscopy performed February 6 of this month revealed 2 ulcers in the ileal reservoir.  A small area of granular mucosa in the ileoanal pouch. Referring records were reviewed.  MRI performed June 6, 2023 revealed inflammatory change and scarring of the J-pouch.

## 2024-04-22 ENCOUNTER — OV EP (OUTPATIENT)
Dept: URBAN - METROPOLITAN AREA CLINIC 113 | Facility: CLINIC | Age: 39
End: 2024-04-22

## 2024-04-29 ENCOUNTER — OV EP (OUTPATIENT)
Dept: URBAN - METROPOLITAN AREA CLINIC 113 | Facility: CLINIC | Age: 39
End: 2024-04-29
Payer: COMMERCIAL

## 2024-04-29 VITALS
BODY MASS INDEX: 25.99 KG/M2 | RESPIRATION RATE: 20 BRPM | HEIGHT: 64 IN | TEMPERATURE: 97.2 F | HEART RATE: 75 BPM | WEIGHT: 152.2 LBS | SYSTOLIC BLOOD PRESSURE: 117 MMHG | DIASTOLIC BLOOD PRESSURE: 75 MMHG

## 2024-04-29 DIAGNOSIS — R11.0 CHRONIC NAUSEA: ICD-10-CM

## 2024-04-29 DIAGNOSIS — K62.89 PROCTALGIA: ICD-10-CM

## 2024-04-29 DIAGNOSIS — K50.118 CROHN'S DISEASE OF COLON WITH OTHER COMPLICATION: ICD-10-CM

## 2024-04-29 PROCEDURE — 99214 OFFICE O/P EST MOD 30 MIN: CPT

## 2024-04-29 RX ORDER — METRONIDAZOLE 7.5 MG/G
IVB  FOR 5 NIGHTS GEL TOPICAL
Qty: 70 | Refills: 0 | Status: ON HOLD | COMMUNITY
Start: 2018-10-29

## 2024-04-29 RX ORDER — ONDANSETRON 8 MG/1
1 TABLET ON THE TONGUE AND ALLOW TO DISSOLVE TABLET, ORALLY DISINTEGRATING ORAL
Qty: 90 | Refills: 1 | OUTPATIENT

## 2024-04-29 RX ORDER — FLUCONAZOLE 150 MG/1
TK 1 T PO QD TABLET ORAL
Qty: 7 | Refills: 0 | Status: ON HOLD | COMMUNITY
Start: 2018-10-23

## 2024-04-29 RX ORDER — USTEKINUMAB 90 MG/ML
AS DIRECTED INJECTION, SOLUTION SUBCUTANEOUS
Status: ACTIVE | COMMUNITY

## 2024-04-29 RX ORDER — LIDOCAINE 5 G/100G
APP TO PERIANAL REGION BID PRN OINTMENT TOPICAL
Qty: 30 | Refills: 0 | Status: ON HOLD | COMMUNITY
Start: 2018-06-11

## 2024-04-29 RX ORDER — OXYCODONE AND ACETAMINOPHEN 5; 325 MG/1; MG/1
TABLET ORAL
Qty: 25 | Refills: 0 | Status: ON HOLD | COMMUNITY
Start: 2019-01-24

## 2024-04-29 RX ORDER — NORETHINDRONE 0.35 MG/1
TABLET ORAL
Qty: 28 | Refills: 0 | Status: ON HOLD | COMMUNITY
Start: 2018-07-25

## 2024-04-29 RX ORDER — MULTIVITAMIN
TAKE 1 TABLET DAILY TABLET ORAL
Refills: 0 | Status: ON HOLD | COMMUNITY

## 2024-04-29 RX ORDER — RISANKIZUMAB-RZAA 180 MG/1.2
1.2 ML KIT SUBCUTANEOUS
Qty: 2 | Refills: 3 | OUTPATIENT
Start: 2024-04-29 | End: 2025-04-24

## 2024-04-29 RX ORDER — KRILL/OM-3/DHA/EPA/PHOSPHO/AST 1000-230MG
1 TABLET CAPSULE ORAL ONCE A DAY
Status: ACTIVE | COMMUNITY

## 2024-04-29 RX ORDER — ONDANSETRON 8 MG/1
1 TABLET ON THE TONGUE AND ALLOW TO DISSOLVE TABLET, ORALLY DISINTEGRATING ORAL
Qty: 90 | Refills: 1 | Status: ACTIVE | COMMUNITY
Start: 2024-01-18

## 2024-04-29 RX ORDER — CYCLOBENZAPRINE HYDROCHLORIDE 10 MG/1
TK 1 T PO TID TABLET, FILM COATED ORAL
Qty: 90 | Refills: 0 | Status: ON HOLD | COMMUNITY
Start: 2018-11-13

## 2024-04-29 RX ORDER — ONDANSETRON 4 MG/1
TABLET, ORALLY DISINTEGRATING ORAL
Qty: 40 | Refills: 0 | Status: ON HOLD | COMMUNITY
Start: 2019-02-12

## 2024-04-29 RX ORDER — PREDNISONE 5 MG/1
AS DIRECTED TABLET ORAL ONCE A DAY
Qty: 308 | Refills: 0 | OUTPATIENT
Start: 2024-04-29 | End: 2024-07-01

## 2024-04-29 RX ORDER — GABAPENTIN 600 MG/1
TAKE 1 TABLET 3 TIMES DAILY TABLET, FILM COATED ORAL
Qty: 90 | Refills: 3 | Status: ON HOLD | COMMUNITY
Start: 2019-11-12

## 2024-04-29 RX ORDER — SERTRALINE 50 MG/1
TABLET, FILM COATED ORAL
Qty: 90 | Refills: 0 | Status: ON HOLD | COMMUNITY
Start: 2019-07-31

## 2024-04-29 RX ORDER — TRAMADOL HYDROCHLORIDE 50 MG/1
TK 1 T PO  Q 6 H TABLET ORAL
Qty: 50 | Refills: 0 | Status: ON HOLD | COMMUNITY
Start: 2018-11-20

## 2024-04-29 RX ORDER — SULFAMETHOXAZOLE AND TRIMETHOPRIM 800; 160 MG/1; MG/1
TK 1 T PO  BID TABLET ORAL
Qty: 14 | Refills: 0 | Status: ON HOLD | COMMUNITY
Start: 2018-10-29

## 2024-04-29 RX ORDER — HYDROCODONE BITARTRATE AND ACETAMINOPHEN 5; 325 MG/1; MG/1
TABLET ORAL
Qty: 16 | Refills: 0 | Status: ON HOLD | COMMUNITY
Start: 2019-05-02

## 2024-04-29 RX ORDER — ONDANSETRON 8 MG/1
TABLET, ORALLY DISINTEGRATING ORAL
Qty: 40 | Refills: 0 | Status: ACTIVE | COMMUNITY
Start: 2018-12-12

## 2024-04-29 RX ORDER — AZATHIOPRINE 50 1/1
TAKE 1 TABLET BY MOUTH DAILY TABLET ORAL
Qty: 90 | Refills: 0 | Status: ON HOLD | COMMUNITY

## 2024-04-29 RX ORDER — ADALIMUMAB 40MG/0.4ML
MAINTENANCE DOSE: ONE 40 MG SQ EVERY OTHER WEEK KIT SUBCUTANEOUS
Qty: 2 | Refills: 6 | Status: ON HOLD | COMMUNITY
Start: 2019-02-06

## 2024-04-29 RX ORDER — SERTRALINE 25 MG/1
TK 1 T PO QD TABLET, FILM COATED ORAL
Qty: 30 | Refills: 0 | Status: ON HOLD | COMMUNITY
Start: 2019-06-18

## 2024-04-29 RX ORDER — ALPRAZOLAM 0.5 MG/1
TABLET ORAL
Qty: 30 | Refills: 0 | Status: ACTIVE | COMMUNITY
Start: 2019-06-18

## 2024-04-29 RX ORDER — DESONIDE 0.5 MG/G
OINTMENT TOPICAL
Qty: 60 | Refills: 0 | Status: ON HOLD | COMMUNITY
Start: 2019-07-12

## 2024-04-29 RX ORDER — PANTOPRAZOLE SODIUM 40 MG/1
TABLET, DELAYED RELEASE ORAL
Qty: 30 | Refills: 0 | Status: ON HOLD | COMMUNITY
Start: 2018-07-16

## 2024-04-29 RX ORDER — RISANKIZUMAB-RZAA 60 MG/ML
AS DIRECTED INJECTION INTRAVENOUS
Refills: 0 | OUTPATIENT
Start: 2024-04-29 | End: 2024-07-28

## 2024-04-29 RX ORDER — CIPROFLOXACIN HYDROCHLORIDE 500 MG/1
1 TABLET TABLET, FILM COATED ORAL TWICE A DAY
Qty: 20 | Refills: 0 | OUTPATIENT
Start: 2024-04-29 | End: 2024-05-09

## 2024-04-29 RX ORDER — BETAMETHASONE DIPROPIONATE 0.5 MG/G
APP EXT AA BID CREAM TOPICAL
Qty: 30 | Refills: 0 | Status: ON HOLD | COMMUNITY
Start: 2018-09-10

## 2024-04-29 RX ORDER — ALPRAZOLAM 1 MG/1
TK ONE T PO 1 HOUR PRIOR TO DENTAL APPT AND BRING REMAINING TS TO APPT TABLET ORAL
Qty: 3 | Refills: 0 | Status: ON HOLD | COMMUNITY
Start: 2019-05-02

## 2024-04-29 RX ORDER — METRONIDAZOLE 500 MG/1
TABLET ORAL
Qty: 42 | Refills: 0 | Status: ON HOLD | COMMUNITY
Start: 2018-11-20

## 2024-04-29 RX ORDER — USTEKINUMAB 90 MG/ML
AS DIRECTED INJECTION, SOLUTION SUBCUTANEOUS
Qty: 1 | Refills: 7 | Status: ACTIVE | COMMUNITY
Start: 2024-01-18 | End: 2025-07-08

## 2024-04-29 RX ORDER — METRONIDAZOLE 500 MG/1
1 TABLET TABLET ORAL
Qty: 30 TABLET | Refills: 0 | OUTPATIENT
Start: 2024-04-29 | End: 2024-05-09

## 2024-04-29 RX ORDER — CIPROFLOXACIN HYDROCHLORIDE 500 MG/1
TABLET, FILM COATED ORAL
Qty: 28 | Refills: 0 | Status: ON HOLD | COMMUNITY
Start: 2018-11-20

## 2024-04-29 RX ORDER — ESCITALOPRAM OXALATE 20 MG/1
TAKE 1 TABLET DAILY TABLET ORAL DAILY
Refills: 0 | Status: ACTIVE | COMMUNITY
Start: 2019-08-26

## 2024-04-29 RX ORDER — LEVOMEFOLATE CALCIUM 15 MG
TAKE 1 TABLET DAILY TABLET ORAL DAILY
Refills: 0 | Status: ACTIVE | COMMUNITY

## 2024-04-29 RX ORDER — AMOXICILLIN 500 MG/1
CAPSULE ORAL
Qty: 21 | Refills: 0 | Status: ON HOLD | COMMUNITY
Start: 2019-05-02

## 2024-04-29 NOTE — HPI-TODAY'S VISIT:
38-year-old female with a history of Crohn's colitis status post total proctocolectomy with J-pouch formation with subsequent reanastomosis, complicated by fistula and cuff-itis presents for long interval follow-up.   She was last seen in 2020. She was doing well on Humira 40 mg every other week.  Her rectal/pelvic pain had improved with gabapentin 600 mg at bedtime and use of a heating pad.  She had undergone CT imaging for evaluation of possible hernia which demonstrated gastric thickening suggestive of gastritis and hepatic steatosis.  She reported upper abdominal pain after meals/nausea with improvement following resumption of pantoprazole.  She was recommended labs and an EGD.  Unfortunately she was out of network and would be self-pay.  She was provided recommendations for other gastroenterologists in the area in network with her insurance.  She had been followed by Dr. Torres in the interim until she moved to [place]. She was last followed by Dr. Curtis Brown in [place]. She had been on Humira weekly prior to Dr. Brown switching her to Stelara in July 2023. Her last pouchoscopy in July 2023 showed active inflammation and ulcers at the suture line and around. 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 She was recommended a repeat pouchoscopy in 6 months to assess response to Stelara. Otherwise, her colorectal surgeon recommends yearly pouchoscopy for surveillance. 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 She does feel better on Stelara compared to Humira. Her next Stelara dose is due around 2/5. She obtains this through [facility]. She has not had any BRBPR. Her chronic pelvic pain has improved, She is still averaging 6-12 stools per day. The consistency ranges from loose to formed. Denies abdominal pain typically. SHe may have a pinching sensation behind her prior ileostomy site. She attributes this to adhesions. She is nauseous most of the time. Denies vomiting. This preceded even her Crohn's diagnosis. SHe utilizes Zofran 8 mg about once every other week. She needs a refill.   She had a stroke prior to her proctocolectomy. She had 12 infarcts total noted on her brain MRI. Workup was negative for hematologic or cardiac etiologies.  It was determined her marked systemic inflammation caused a hypercoagulable state. She has not had any CVAs since then.  Interval history, 4/29/2024.  Pouchoscopy performed February 6 of this month revealed 2 ulcers in the ileal reservoir.  A small area of granular mucosa in the ileoanal pouch. Referring records were reviewed.  MRI performed June 6, 2023 revealed inflammatory change and scarring of the J-pouch.  Labs performed in January showed normal CBC, CMP, CRP. Stelara levels and ab levels are normal.  She was not able to get her Stelara for 3 months due to insurance and pharmacy issues. She believes she is now flaring. She has 15+ bowel movements a day and severe rectal pain and pressure. She denies fevers, chills or abdominal pain.

## 2024-05-03 ENCOUNTER — TELEPHONE ENCOUNTER (OUTPATIENT)
Dept: URBAN - METROPOLITAN AREA CLINIC 97 | Facility: CLINIC | Age: 39
End: 2024-05-03

## 2024-05-22 ENCOUNTER — TELEPHONE ENCOUNTER (OUTPATIENT)
Dept: URBAN - METROPOLITAN AREA CLINIC 113 | Facility: CLINIC | Age: 39
End: 2024-05-22

## 2024-07-09 ENCOUNTER — OFFICE VISIT (OUTPATIENT)
Dept: URBAN - METROPOLITAN AREA CLINIC 113 | Facility: CLINIC | Age: 39
End: 2024-07-09

## 2024-08-14 ENCOUNTER — TELEPHONE ENCOUNTER (OUTPATIENT)
Dept: URBAN - METROPOLITAN AREA CLINIC 113 | Facility: CLINIC | Age: 39
End: 2024-08-14

## 2024-08-27 ENCOUNTER — OFFICE VISIT (OUTPATIENT)
Dept: URBAN - METROPOLITAN AREA CLINIC 112 | Facility: CLINIC | Age: 39
End: 2024-08-27
Payer: COMMERCIAL

## 2024-08-27 ENCOUNTER — TELEPHONE ENCOUNTER (OUTPATIENT)
Dept: URBAN - METROPOLITAN AREA CLINIC 113 | Facility: CLINIC | Age: 39
End: 2024-08-27

## 2024-08-27 DIAGNOSIS — K50.118 CROHN'S DISEASE OF COLON WITH OTHER COMPLICATION: ICD-10-CM

## 2024-08-27 PROCEDURE — 96413 CHEMO IV INFUSION 1 HR: CPT | Performed by: INTERNAL MEDICINE

## 2024-08-27 RX ORDER — AZATHIOPRINE 50 1/1
TAKE 1 TABLET BY MOUTH DAILY TABLET ORAL
Qty: 90 | Refills: 0 | Status: ON HOLD | COMMUNITY

## 2024-08-27 RX ORDER — METRONIDAZOLE 7.5 MG/G
IVB  FOR 5 NIGHTS GEL TOPICAL
Qty: 70 | Refills: 0 | Status: ON HOLD | COMMUNITY
Start: 2018-10-29

## 2024-08-27 RX ORDER — ADALIMUMAB 40MG/0.4ML
MAINTENANCE DOSE: ONE 40 MG SQ EVERY OTHER WEEK KIT SUBCUTANEOUS
Qty: 2 | Refills: 6 | Status: ON HOLD | COMMUNITY
Start: 2019-02-06

## 2024-08-27 RX ORDER — ONDANSETRON 4 MG/1
TABLET, ORALLY DISINTEGRATING ORAL
Qty: 40 | Refills: 0 | Status: ON HOLD | COMMUNITY
Start: 2019-02-12

## 2024-08-27 RX ORDER — KRILL/OM-3/DHA/EPA/PHOSPHO/AST 1000-230MG
1 TABLET CAPSULE ORAL ONCE A DAY
Status: ACTIVE | COMMUNITY

## 2024-08-27 RX ORDER — AMOXICILLIN 500 MG/1
CAPSULE ORAL
Qty: 21 | Refills: 0 | Status: ON HOLD | COMMUNITY
Start: 2019-05-02

## 2024-08-27 RX ORDER — CYCLOBENZAPRINE HYDROCHLORIDE 10 MG/1
TK 1 T PO TID TABLET, FILM COATED ORAL
Qty: 90 | Refills: 0 | Status: ON HOLD | COMMUNITY
Start: 2018-11-13

## 2024-08-27 RX ORDER — SULFAMETHOXAZOLE AND TRIMETHOPRIM 800; 160 MG/1; MG/1
TK 1 T PO  BID TABLET ORAL
Qty: 14 | Refills: 0 | Status: ON HOLD | COMMUNITY
Start: 2018-10-29

## 2024-08-27 RX ORDER — ALPRAZOLAM 1 MG/1
TK ONE T PO 1 HOUR PRIOR TO DENTAL APPT AND BRING REMAINING TS TO APPT TABLET ORAL
Qty: 3 | Refills: 0 | Status: ON HOLD | COMMUNITY
Start: 2019-05-02

## 2024-08-27 RX ORDER — USTEKINUMAB 90 MG/ML
AS DIRECTED INJECTION, SOLUTION SUBCUTANEOUS
Status: ACTIVE | COMMUNITY

## 2024-08-27 RX ORDER — GABAPENTIN 600 MG/1
TAKE 1 TABLET 3 TIMES DAILY TABLET, FILM COATED ORAL
Qty: 90 | Refills: 3 | Status: ON HOLD | COMMUNITY
Start: 2019-11-12

## 2024-08-27 RX ORDER — RISANKIZUMAB-RZAA 180 MG/1.2
1.2 ML KIT SUBCUTANEOUS
Qty: 2 | Refills: 3 | Status: ACTIVE | COMMUNITY
Start: 2024-04-29 | End: 2025-04-24

## 2024-08-27 RX ORDER — CIPROFLOXACIN HYDROCHLORIDE 500 MG/1
TABLET, FILM COATED ORAL
Qty: 28 | Refills: 0 | Status: ON HOLD | COMMUNITY
Start: 2018-11-20

## 2024-08-27 RX ORDER — LEVOMEFOLATE CALCIUM 15 MG
TAKE 1 TABLET DAILY TABLET ORAL DAILY
Refills: 0 | Status: ACTIVE | COMMUNITY

## 2024-08-27 RX ORDER — NORETHINDRONE 0.35 MG/1
TABLET ORAL
Qty: 28 | Refills: 0 | Status: ON HOLD | COMMUNITY
Start: 2018-07-25

## 2024-08-27 RX ORDER — LIDOCAINE 5 G/100G
APP TO PERIANAL REGION BID PRN OINTMENT TOPICAL
Qty: 30 | Refills: 0 | Status: ON HOLD | COMMUNITY
Start: 2018-06-11

## 2024-08-27 RX ORDER — ONDANSETRON 8 MG/1
1 TABLET ON THE TONGUE AND ALLOW TO DISSOLVE TABLET, ORALLY DISINTEGRATING ORAL
Qty: 90 | Refills: 1 | Status: ACTIVE | COMMUNITY

## 2024-08-27 RX ORDER — BETAMETHASONE DIPROPIONATE 0.5 MG/G
APP EXT AA BID CREAM TOPICAL
Qty: 30 | Refills: 0 | Status: ON HOLD | COMMUNITY
Start: 2018-09-10

## 2024-08-27 RX ORDER — SERTRALINE 50 MG/1
TABLET, FILM COATED ORAL
Qty: 90 | Refills: 0 | Status: ON HOLD | COMMUNITY
Start: 2019-07-31

## 2024-08-27 RX ORDER — METRONIDAZOLE 500 MG/1
TABLET ORAL
Qty: 42 | Refills: 0 | Status: ON HOLD | COMMUNITY
Start: 2018-11-20

## 2024-08-27 RX ORDER — SERTRALINE 25 MG/1
TK 1 T PO QD TABLET, FILM COATED ORAL
Qty: 30 | Refills: 0 | Status: ON HOLD | COMMUNITY
Start: 2019-06-18

## 2024-08-27 RX ORDER — ALPRAZOLAM 0.5 MG/1
TABLET ORAL
Qty: 30 | Refills: 0 | Status: ACTIVE | COMMUNITY
Start: 2019-06-18

## 2024-08-27 RX ORDER — ONDANSETRON 8 MG/1
TABLET, ORALLY DISINTEGRATING ORAL
Qty: 40 | Refills: 0 | Status: ACTIVE | COMMUNITY
Start: 2018-12-12

## 2024-08-27 RX ORDER — ESCITALOPRAM OXALATE 20 MG/1
TAKE 1 TABLET DAILY TABLET ORAL DAILY
Refills: 0 | Status: ACTIVE | COMMUNITY
Start: 2019-08-26

## 2024-08-27 RX ORDER — FLUCONAZOLE 150 MG/1
TK 1 T PO QD TABLET ORAL
Qty: 7 | Refills: 0 | Status: ON HOLD | COMMUNITY
Start: 2018-10-23

## 2024-08-27 RX ORDER — MULTIVITAMIN
TAKE 1 TABLET DAILY TABLET ORAL
Refills: 0 | Status: ON HOLD | COMMUNITY

## 2024-08-27 RX ORDER — HYDROCODONE BITARTRATE AND ACETAMINOPHEN 5; 325 MG/1; MG/1
TABLET ORAL
Qty: 16 | Refills: 0 | Status: ON HOLD | COMMUNITY
Start: 2019-05-02

## 2024-08-27 RX ORDER — TRAMADOL HYDROCHLORIDE 50 MG/1
TK 1 T PO  Q 6 H TABLET ORAL
Qty: 50 | Refills: 0 | Status: ON HOLD | COMMUNITY
Start: 2018-11-20

## 2024-08-27 RX ORDER — PANTOPRAZOLE SODIUM 40 MG/1
TABLET, DELAYED RELEASE ORAL
Qty: 30 | Refills: 0 | Status: ON HOLD | COMMUNITY
Start: 2018-07-16

## 2024-08-27 RX ORDER — USTEKINUMAB 90 MG/ML
AS DIRECTED INJECTION, SOLUTION SUBCUTANEOUS
Qty: 1 | Refills: 7 | Status: ACTIVE | COMMUNITY
Start: 2024-01-18 | End: 2025-07-08

## 2024-08-27 RX ORDER — OXYCODONE AND ACETAMINOPHEN 5; 325 MG/1; MG/1
TABLET ORAL
Qty: 25 | Refills: 0 | Status: ON HOLD | COMMUNITY
Start: 2019-01-24

## 2024-08-27 RX ORDER — DESONIDE 0.5 MG/G
OINTMENT TOPICAL
Qty: 60 | Refills: 0 | Status: ON HOLD | COMMUNITY
Start: 2019-07-12

## 2024-09-20 ENCOUNTER — TELEPHONE ENCOUNTER (OUTPATIENT)
Dept: URBAN - METROPOLITAN AREA CLINIC 113 | Facility: CLINIC | Age: 39
End: 2024-09-20

## 2024-09-27 ENCOUNTER — OFFICE VISIT (OUTPATIENT)
Dept: URBAN - METROPOLITAN AREA CLINIC 112 | Facility: CLINIC | Age: 39
End: 2024-09-27

## 2024-09-29 ENCOUNTER — TELEPHONE ENCOUNTER (OUTPATIENT)
Dept: URBAN - METROPOLITAN AREA CLINIC 112 | Facility: CLINIC | Age: 39
End: 2024-09-29

## 2024-09-30 ENCOUNTER — OFFICE VISIT (OUTPATIENT)
Dept: URBAN - METROPOLITAN AREA CLINIC 112 | Facility: CLINIC | Age: 39
End: 2024-09-30

## 2024-10-02 ENCOUNTER — OFFICE VISIT (OUTPATIENT)
Dept: URBAN - METROPOLITAN AREA CLINIC 112 | Facility: CLINIC | Age: 39
End: 2024-10-02
Payer: COMMERCIAL

## 2024-10-02 VITALS
TEMPERATURE: 97.1 F | RESPIRATION RATE: 20 BRPM | SYSTOLIC BLOOD PRESSURE: 114 MMHG | HEIGHT: 64 IN | DIASTOLIC BLOOD PRESSURE: 70 MMHG | HEART RATE: 74 BPM | WEIGHT: 157 LBS | BODY MASS INDEX: 26.8 KG/M2

## 2024-10-02 DIAGNOSIS — K50.118 CROHN'S DISEASE OF COLON WITH OTHER COMPLICATION: ICD-10-CM

## 2024-10-02 PROCEDURE — 96413 CHEMO IV INFUSION 1 HR: CPT | Performed by: INTERNAL MEDICINE

## 2024-10-02 RX ORDER — RISANKIZUMAB-RZAA 180 MG/1.2
1.2 ML KIT SUBCUTANEOUS
Qty: 2 | Refills: 3 | Status: ACTIVE | COMMUNITY
Start: 2024-04-29 | End: 2025-04-24

## 2024-10-02 RX ORDER — ESCITALOPRAM OXALATE 20 MG/1
TAKE 1 TABLET DAILY TABLET ORAL DAILY
Refills: 0 | Status: ACTIVE | COMMUNITY
Start: 2019-08-26

## 2024-10-02 RX ORDER — METRONIDAZOLE 7.5 MG/G
IVB  FOR 5 NIGHTS GEL TOPICAL
Qty: 70 | Refills: 0 | Status: ON HOLD | COMMUNITY
Start: 2018-10-29

## 2024-10-02 RX ORDER — TRAMADOL HYDROCHLORIDE 50 MG/1
TK 1 T PO  Q 6 H TABLET, FILM COATED ORAL
Qty: 50 | Refills: 0 | Status: ON HOLD | COMMUNITY
Start: 2018-11-20

## 2024-10-02 RX ORDER — AZATHIOPRINE 50 1/1
TAKE 1 TABLET BY MOUTH DAILY TABLET ORAL
Qty: 90 | Refills: 0 | Status: ON HOLD | COMMUNITY

## 2024-10-02 RX ORDER — MULTIVITAMIN
TAKE 1 TABLET DAILY TABLET ORAL
Refills: 0 | Status: ON HOLD | COMMUNITY

## 2024-10-02 RX ORDER — LEVOMEFOLATE CALCIUM 15 MG
TAKE 1 TABLET DAILY TABLET ORAL DAILY
Refills: 0 | Status: ACTIVE | COMMUNITY

## 2024-10-02 RX ORDER — NORETHINDRONE 0.35 MG/1
TABLET ORAL
Qty: 28 | Refills: 0 | Status: ON HOLD | COMMUNITY
Start: 2018-07-25

## 2024-10-02 RX ORDER — SERTRALINE 50 MG/1
TABLET, FILM COATED ORAL
Qty: 90 | Refills: 0 | Status: ON HOLD | COMMUNITY
Start: 2019-07-31

## 2024-10-02 RX ORDER — OXYCODONE AND ACETAMINOPHEN 5; 325 MG/1; MG/1
TABLET ORAL
Qty: 25 | Refills: 0 | Status: ON HOLD | COMMUNITY
Start: 2019-01-24

## 2024-10-02 RX ORDER — AMOXICILLIN 500 MG/1
CAPSULE ORAL
Qty: 21 | Refills: 0 | Status: ON HOLD | COMMUNITY
Start: 2019-05-02

## 2024-10-02 RX ORDER — FLUCONAZOLE 150 MG/1
TK 1 T PO QD TABLET ORAL
Qty: 7 | Refills: 0 | Status: ON HOLD | COMMUNITY
Start: 2018-10-23

## 2024-10-02 RX ORDER — CYCLOBENZAPRINE HYDROCHLORIDE 10 MG/1
TK 1 T PO TID TABLET, FILM COATED ORAL
Qty: 90 | Refills: 0 | Status: ON HOLD | COMMUNITY
Start: 2018-11-13

## 2024-10-02 RX ORDER — PANTOPRAZOLE SODIUM 40 MG/1
TABLET, DELAYED RELEASE ORAL
Qty: 30 | Refills: 0 | Status: ON HOLD | COMMUNITY
Start: 2018-07-16

## 2024-10-02 RX ORDER — SULFAMETHOXAZOLE AND TRIMETHOPRIM 800; 160 MG/1; MG/1
TK 1 T PO  BID TABLET ORAL
Qty: 14 | Refills: 0 | Status: ON HOLD | COMMUNITY
Start: 2018-10-29

## 2024-10-02 RX ORDER — CIPROFLOXACIN HYDROCHLORIDE 500 MG/1
TABLET, FILM COATED ORAL
Qty: 28 | Refills: 0 | Status: ON HOLD | COMMUNITY
Start: 2018-11-20

## 2024-10-02 RX ORDER — KRILL/OM-3/DHA/EPA/PHOSPHO/AST 1000-230MG
1 TABLET CAPSULE ORAL ONCE A DAY
Status: ACTIVE | COMMUNITY

## 2024-10-02 RX ORDER — ONDANSETRON 4 MG/1
TABLET, ORALLY DISINTEGRATING ORAL
Qty: 40 | Refills: 0 | Status: ON HOLD | COMMUNITY
Start: 2019-02-12

## 2024-10-02 RX ORDER — SERTRALINE 25 MG/1
TK 1 T PO QD TABLET, FILM COATED ORAL
Qty: 30 | Refills: 0 | Status: ON HOLD | COMMUNITY
Start: 2019-06-18

## 2024-10-02 RX ORDER — ALPRAZOLAM 0.5 MG/1
TABLET ORAL
Qty: 30 | Refills: 0 | Status: ACTIVE | COMMUNITY
Start: 2019-06-18

## 2024-10-02 RX ORDER — USTEKINUMAB 90 MG/ML
AS DIRECTED INJECTION, SOLUTION SUBCUTANEOUS
Qty: 1 | Refills: 7 | Status: ACTIVE | COMMUNITY
Start: 2024-01-18 | End: 2025-07-08

## 2024-10-02 RX ORDER — ALPRAZOLAM 1 MG/1
TK ONE T PO 1 HOUR PRIOR TO DENTAL APPT AND BRING REMAINING TS TO APPT TABLET ORAL
Qty: 3 | Refills: 0 | Status: ON HOLD | COMMUNITY
Start: 2019-05-02

## 2024-10-02 RX ORDER — GABAPENTIN 600 MG/1
TAKE 1 TABLET 3 TIMES DAILY TABLET, FILM COATED ORAL
Qty: 90 | Refills: 3 | Status: ON HOLD | COMMUNITY
Start: 2019-11-12

## 2024-10-02 RX ORDER — DESONIDE 0.5 MG/G
OINTMENT TOPICAL
Qty: 60 | Refills: 0 | Status: ON HOLD | COMMUNITY
Start: 2019-07-12

## 2024-10-02 RX ORDER — HYDROCODONE BITARTRATE AND ACETAMINOPHEN 5; 325 MG/1; MG/1
TABLET ORAL
Qty: 16 | Refills: 0 | Status: ON HOLD | COMMUNITY
Start: 2019-05-02

## 2024-10-02 RX ORDER — ONDANSETRON 8 MG/1
1 TABLET ON THE TONGUE AND ALLOW TO DISSOLVE TABLET, ORALLY DISINTEGRATING ORAL
Qty: 90 | Refills: 1 | Status: ACTIVE | COMMUNITY

## 2024-10-02 RX ORDER — BETAMETHASONE DIPROPIONATE 0.5 MG/G
APP EXT AA BID CREAM TOPICAL
Qty: 30 | Refills: 0 | Status: ON HOLD | COMMUNITY
Start: 2018-09-10

## 2024-10-02 RX ORDER — LIDOCAINE 5 G/100G
APP TO PERIANAL REGION BID PRN OINTMENT TOPICAL
Qty: 30 | Refills: 0 | Status: ON HOLD | COMMUNITY
Start: 2018-06-11

## 2024-10-02 RX ORDER — ONDANSETRON 8 MG/1
TABLET, ORALLY DISINTEGRATING ORAL
Qty: 40 | Refills: 0 | Status: ACTIVE | COMMUNITY
Start: 2018-12-12

## 2024-10-02 RX ORDER — METRONIDAZOLE 500 MG/1
TABLET ORAL
Qty: 42 | Refills: 0 | Status: ON HOLD | COMMUNITY
Start: 2018-11-20

## 2024-10-02 RX ORDER — ADALIMUMAB 40MG/0.4ML
MAINTENANCE DOSE: ONE 40 MG SQ EVERY OTHER WEEK KIT SUBCUTANEOUS
Qty: 2 | Refills: 6 | Status: ON HOLD | COMMUNITY
Start: 2019-02-06

## 2024-10-02 RX ORDER — USTEKINUMAB 90 MG/ML
AS DIRECTED INJECTION, SOLUTION SUBCUTANEOUS
Status: ACTIVE | COMMUNITY

## 2024-10-30 ENCOUNTER — TELEPHONE ENCOUNTER (OUTPATIENT)
Dept: URBAN - METROPOLITAN AREA CLINIC 113 | Facility: CLINIC | Age: 39
End: 2024-10-30

## 2024-10-30 ENCOUNTER — OFFICE VISIT (OUTPATIENT)
Dept: URBAN - METROPOLITAN AREA CLINIC 112 | Facility: CLINIC | Age: 39
End: 2024-10-30
Payer: COMMERCIAL

## 2024-10-30 VITALS
WEIGHT: 161 LBS | HEIGHT: 64 IN | TEMPERATURE: 97.5 F | SYSTOLIC BLOOD PRESSURE: 117 MMHG | RESPIRATION RATE: 16 BRPM | BODY MASS INDEX: 27.49 KG/M2 | DIASTOLIC BLOOD PRESSURE: 79 MMHG | HEART RATE: 69 BPM

## 2024-10-30 DIAGNOSIS — K50.118 CROHN'S DISEASE OF COLON WITH OTHER COMPLICATION: ICD-10-CM

## 2024-10-30 PROCEDURE — 96413 CHEMO IV INFUSION 1 HR: CPT | Performed by: INTERNAL MEDICINE

## 2024-10-30 RX ORDER — SERTRALINE 50 MG/1
TABLET, FILM COATED ORAL
Qty: 90 | Refills: 0 | Status: ON HOLD | COMMUNITY
Start: 2019-07-31

## 2024-10-30 RX ORDER — USTEKINUMAB 90 MG/ML
AS DIRECTED INJECTION, SOLUTION SUBCUTANEOUS
Qty: 1 | Refills: 7 | Status: ACTIVE | COMMUNITY
Start: 2024-01-18 | End: 2025-07-08

## 2024-10-30 RX ORDER — DESONIDE 0.5 MG/G
OINTMENT TOPICAL
Qty: 60 | Refills: 0 | Status: ON HOLD | COMMUNITY
Start: 2019-07-12

## 2024-10-30 RX ORDER — PANTOPRAZOLE SODIUM 40 MG/1
TABLET, DELAYED RELEASE ORAL
Qty: 30 | Refills: 0 | Status: ON HOLD | COMMUNITY
Start: 2018-07-16

## 2024-10-30 RX ORDER — CIPROFLOXACIN HYDROCHLORIDE 500 MG/1
TABLET, FILM COATED ORAL
Qty: 28 | Refills: 0 | Status: ON HOLD | COMMUNITY
Start: 2018-11-20

## 2024-10-30 RX ORDER — OXYCODONE AND ACETAMINOPHEN 5; 325 MG/1; MG/1
TABLET ORAL
Qty: 25 | Refills: 0 | Status: ON HOLD | COMMUNITY
Start: 2019-01-24

## 2024-10-30 RX ORDER — KRILL/OM-3/DHA/EPA/PHOSPHO/AST 1000-230MG
1 TABLET CAPSULE ORAL ONCE A DAY
Status: ACTIVE | COMMUNITY

## 2024-10-30 RX ORDER — ALPRAZOLAM 1 MG/1
TK ONE T PO 1 HOUR PRIOR TO DENTAL APPT AND BRING REMAINING TS TO APPT TABLET ORAL
Qty: 3 | Refills: 0 | Status: ON HOLD | COMMUNITY
Start: 2019-05-02

## 2024-10-30 RX ORDER — LEVOMEFOLATE CALCIUM 15 MG
TAKE 1 TABLET DAILY TABLET ORAL DAILY
Refills: 0 | Status: ACTIVE | COMMUNITY

## 2024-10-30 RX ORDER — ALPRAZOLAM 0.5 MG/1
TABLET ORAL
Qty: 30 | Refills: 0 | Status: ACTIVE | COMMUNITY
Start: 2019-06-18

## 2024-10-30 RX ORDER — FLUCONAZOLE 150 MG/1
TK 1 T PO QD TABLET ORAL
Qty: 7 | Refills: 0 | Status: ON HOLD | COMMUNITY
Start: 2018-10-23

## 2024-10-30 RX ORDER — NORETHINDRONE 0.35 MG/1
TABLET ORAL
Qty: 28 | Refills: 0 | Status: ON HOLD | COMMUNITY
Start: 2018-07-25

## 2024-10-30 RX ORDER — CYCLOBENZAPRINE HYDROCHLORIDE 10 MG/1
TK 1 T PO TID TABLET, FILM COATED ORAL
Qty: 90 | Refills: 0 | Status: ON HOLD | COMMUNITY
Start: 2018-11-13

## 2024-10-30 RX ORDER — SERTRALINE 25 MG/1
TK 1 T PO QD TABLET, FILM COATED ORAL
Qty: 30 | Refills: 0 | Status: ON HOLD | COMMUNITY
Start: 2019-06-18

## 2024-10-30 RX ORDER — LIDOCAINE 5 G/100G
APP TO PERIANAL REGION BID PRN OINTMENT TOPICAL
Qty: 30 | Refills: 0 | Status: ON HOLD | COMMUNITY
Start: 2018-06-11

## 2024-10-30 RX ORDER — AMOXICILLIN 500 MG/1
CAPSULE ORAL
Qty: 21 | Refills: 0 | Status: ON HOLD | COMMUNITY
Start: 2019-05-02

## 2024-10-30 RX ORDER — METRONIDAZOLE 500 MG/1
TABLET ORAL
Qty: 42 | Refills: 0 | Status: ON HOLD | COMMUNITY
Start: 2018-11-20

## 2024-10-30 RX ORDER — METRONIDAZOLE 7.5 MG/G
IVB  FOR 5 NIGHTS GEL TOPICAL
Qty: 70 | Refills: 0 | Status: ON HOLD | COMMUNITY
Start: 2018-10-29

## 2024-10-30 RX ORDER — GABAPENTIN 600 MG/1
TAKE 1 TABLET 3 TIMES DAILY TABLET, FILM COATED ORAL
Qty: 90 | Refills: 3 | Status: ON HOLD | COMMUNITY
Start: 2019-11-12

## 2024-10-30 RX ORDER — USTEKINUMAB 90 MG/ML
AS DIRECTED INJECTION, SOLUTION SUBCUTANEOUS
Status: ACTIVE | COMMUNITY

## 2024-10-30 RX ORDER — BETAMETHASONE DIPROPIONATE 0.5 MG/G
APP EXT AA BID CREAM TOPICAL
Qty: 30 | Refills: 0 | Status: ON HOLD | COMMUNITY
Start: 2018-09-10

## 2024-10-30 RX ORDER — ONDANSETRON 4 MG/1
TABLET, ORALLY DISINTEGRATING ORAL
Qty: 40 | Refills: 0 | Status: ON HOLD | COMMUNITY
Start: 2019-02-12

## 2024-10-30 RX ORDER — ESCITALOPRAM OXALATE 20 MG/1
TAKE 1 TABLET DAILY TABLET ORAL DAILY
Refills: 0 | Status: ACTIVE | COMMUNITY
Start: 2019-08-26

## 2024-10-30 RX ORDER — TRAMADOL HYDROCHLORIDE 50 MG/1
TK 1 T PO  Q 6 H TABLET, FILM COATED ORAL
Qty: 50 | Refills: 0 | Status: ON HOLD | COMMUNITY
Start: 2018-11-20

## 2024-10-30 RX ORDER — MULTIVITAMIN
TAKE 1 TABLET DAILY TABLET ORAL
Refills: 0 | Status: ON HOLD | COMMUNITY

## 2024-10-30 RX ORDER — ONDANSETRON 8 MG/1
TABLET, ORALLY DISINTEGRATING ORAL
Qty: 40 | Refills: 0 | Status: ACTIVE | COMMUNITY
Start: 2018-12-12

## 2024-10-30 RX ORDER — ADALIMUMAB 40MG/0.4ML
MAINTENANCE DOSE: ONE 40 MG SQ EVERY OTHER WEEK KIT SUBCUTANEOUS
Qty: 2 | Refills: 6 | Status: ON HOLD | COMMUNITY
Start: 2019-02-06

## 2024-10-30 RX ORDER — RISANKIZUMAB-RZAA 180 MG/1.2
1.2 ML KIT SUBCUTANEOUS
Qty: 2 | Refills: 3 | Status: ACTIVE | COMMUNITY
Start: 2024-04-29 | End: 2025-04-24

## 2024-10-30 RX ORDER — ONDANSETRON 8 MG/1
1 TABLET ON THE TONGUE AND ALLOW TO DISSOLVE TABLET, ORALLY DISINTEGRATING ORAL
Qty: 90 | Refills: 1 | Status: ACTIVE | COMMUNITY

## 2024-10-30 RX ORDER — AZATHIOPRINE 50 1/1
TAKE 1 TABLET BY MOUTH DAILY TABLET ORAL
Qty: 90 | Refills: 0 | Status: ON HOLD | COMMUNITY

## 2024-10-30 RX ORDER — HYDROCODONE BITARTRATE AND ACETAMINOPHEN 5; 325 MG/1; MG/1
TABLET ORAL
Qty: 16 | Refills: 0 | Status: ON HOLD | COMMUNITY
Start: 2019-05-02

## 2024-10-30 RX ORDER — SULFAMETHOXAZOLE AND TRIMETHOPRIM 800; 160 MG/1; MG/1
TK 1 T PO  BID TABLET ORAL
Qty: 14 | Refills: 0 | Status: ON HOLD | COMMUNITY
Start: 2018-10-29

## 2024-10-31 ENCOUNTER — ERX REFILL RESPONSE (OUTPATIENT)
Dept: URBAN - METROPOLITAN AREA CLINIC 113 | Facility: CLINIC | Age: 39
End: 2024-10-31

## 2024-10-31 RX ORDER — ONDANSETRON 8 MG/1
1 TABLET ON THE TONGUE AND ALLOW TO DISSOLVE TABLET, ORALLY DISINTEGRATING ORAL
Qty: 50 | Refills: 1 | OUTPATIENT

## 2024-10-31 RX ORDER — ONDANSETRON 8 MG/1
1 TABLET ON THE TONGUE AND ALLOW TO DISSOLVE TABLET, ORALLY DISINTEGRATING ORAL
Qty: 90 | Refills: 1 | OUTPATIENT

## 2024-11-05 ENCOUNTER — OFFICE VISIT (OUTPATIENT)
Dept: URBAN - METROPOLITAN AREA CLINIC 113 | Facility: CLINIC | Age: 39
End: 2024-11-05

## 2024-11-05 RX ORDER — ONDANSETRON 8 MG/1
1 TABLET ON THE TONGUE AND ALLOW TO DISSOLVE TABLET, ORALLY DISINTEGRATING ORAL
Qty: 50 | Refills: 1 | Status: ACTIVE | COMMUNITY

## 2024-11-05 RX ORDER — USTEKINUMAB 90 MG/ML
AS DIRECTED INJECTION, SOLUTION SUBCUTANEOUS
Status: ACTIVE | COMMUNITY

## 2024-11-05 RX ORDER — RISANKIZUMAB-RZAA 180 MG/1.2
1.2 ML KIT SUBCUTANEOUS
Qty: 2 | Refills: 3 | Status: ACTIVE | COMMUNITY
Start: 2024-04-29 | End: 2025-04-24

## 2024-11-05 RX ORDER — SERTRALINE 25 MG/1
TK 1 T PO QD TABLET, FILM COATED ORAL
Qty: 30 | Refills: 0 | Status: ON HOLD | COMMUNITY
Start: 2019-06-18

## 2024-11-05 RX ORDER — GABAPENTIN 600 MG/1
TAKE 1 TABLET 3 TIMES DAILY TABLET, FILM COATED ORAL
Qty: 90 | Refills: 3 | Status: ON HOLD | COMMUNITY
Start: 2019-11-12

## 2024-11-05 RX ORDER — MULTIVITAMIN
TAKE 1 TABLET DAILY TABLET ORAL
Refills: 0 | Status: ON HOLD | COMMUNITY

## 2024-11-05 RX ORDER — METRONIDAZOLE 7.5 MG/G
IVB  FOR 5 NIGHTS GEL TOPICAL
Qty: 70 | Refills: 0 | Status: ON HOLD | COMMUNITY
Start: 2018-10-29

## 2024-11-05 RX ORDER — SULFAMETHOXAZOLE AND TRIMETHOPRIM 800; 160 MG/1; MG/1
TK 1 T PO  BID TABLET ORAL
Qty: 14 | Refills: 0 | Status: ON HOLD | COMMUNITY
Start: 2018-10-29

## 2024-11-05 RX ORDER — CIPROFLOXACIN HYDROCHLORIDE 500 MG/1
TABLET, FILM COATED ORAL
Qty: 28 | Refills: 0 | Status: ON HOLD | COMMUNITY
Start: 2018-11-20

## 2024-11-05 RX ORDER — AZATHIOPRINE 50 1/1
TAKE 1 TABLET BY MOUTH DAILY TABLET ORAL
Qty: 90 | Refills: 0 | Status: ON HOLD | COMMUNITY

## 2024-11-05 RX ORDER — ALPRAZOLAM 0.5 MG/1
TABLET ORAL
Qty: 30 | Refills: 0 | Status: ACTIVE | COMMUNITY
Start: 2019-06-18

## 2024-11-05 RX ORDER — DESONIDE 0.5 MG/G
OINTMENT TOPICAL
Qty: 60 | Refills: 0 | Status: ON HOLD | COMMUNITY
Start: 2019-07-12

## 2024-11-05 RX ORDER — OXYCODONE AND ACETAMINOPHEN 5; 325 MG/1; MG/1
TABLET ORAL
Qty: 25 | Refills: 0 | Status: ON HOLD | COMMUNITY
Start: 2019-01-24

## 2024-11-05 RX ORDER — SERTRALINE 50 MG/1
TABLET, FILM COATED ORAL
Qty: 90 | Refills: 0 | Status: ON HOLD | COMMUNITY
Start: 2019-07-31

## 2024-11-05 RX ORDER — PANTOPRAZOLE SODIUM 40 MG/1
TABLET, DELAYED RELEASE ORAL
Qty: 30 | Refills: 0 | Status: ON HOLD | COMMUNITY
Start: 2018-07-16

## 2024-11-05 RX ORDER — HYDROCODONE BITARTRATE AND ACETAMINOPHEN 5; 325 MG/1; MG/1
TABLET ORAL
Qty: 16 | Refills: 0 | Status: ON HOLD | COMMUNITY
Start: 2019-05-02

## 2024-11-05 RX ORDER — ONDANSETRON 4 MG/1
TABLET, ORALLY DISINTEGRATING ORAL
Qty: 40 | Refills: 0 | Status: ON HOLD | COMMUNITY
Start: 2019-02-12

## 2024-11-05 RX ORDER — LIDOCAINE 5 G/100G
APP TO PERIANAL REGION BID PRN OINTMENT TOPICAL
Qty: 30 | Refills: 0 | Status: ON HOLD | COMMUNITY
Start: 2018-06-11

## 2024-11-05 RX ORDER — ESCITALOPRAM OXALATE 20 MG/1
TAKE 1 TABLET DAILY TABLET ORAL DAILY
Refills: 0 | Status: ACTIVE | COMMUNITY
Start: 2019-08-26

## 2024-11-05 RX ORDER — FLUCONAZOLE 150 MG/1
TK 1 T PO QD TABLET ORAL
Qty: 7 | Refills: 0 | Status: ON HOLD | COMMUNITY
Start: 2018-10-23

## 2024-11-05 RX ORDER — METRONIDAZOLE 500 MG/1
TABLET ORAL
Qty: 42 | Refills: 0 | Status: ON HOLD | COMMUNITY
Start: 2018-11-20

## 2024-11-05 RX ORDER — ALPRAZOLAM 1 MG/1
TK ONE T PO 1 HOUR PRIOR TO DENTAL APPT AND BRING REMAINING TS TO APPT TABLET ORAL
Qty: 3 | Refills: 0 | Status: ON HOLD | COMMUNITY
Start: 2019-05-02

## 2024-11-05 RX ORDER — NORETHINDRONE 0.35 MG/1
TABLET ORAL
Qty: 28 | Refills: 0 | Status: ON HOLD | COMMUNITY
Start: 2018-07-25

## 2024-11-05 RX ORDER — TRAMADOL HYDROCHLORIDE 50 MG/1
TK 1 T PO  Q 6 H TABLET, FILM COATED ORAL
Qty: 50 | Refills: 0 | Status: ON HOLD | COMMUNITY
Start: 2018-11-20

## 2024-11-05 RX ORDER — BETAMETHASONE DIPROPIONATE 0.5 MG/G
APP EXT AA BID CREAM TOPICAL
Qty: 30 | Refills: 0 | Status: ON HOLD | COMMUNITY
Start: 2018-09-10

## 2024-11-05 RX ORDER — USTEKINUMAB 90 MG/ML
AS DIRECTED INJECTION, SOLUTION SUBCUTANEOUS
Qty: 1 | Refills: 7 | Status: ACTIVE | COMMUNITY
Start: 2024-01-18 | End: 2025-07-08

## 2024-11-05 RX ORDER — LEVOMEFOLATE CALCIUM 15 MG
TAKE 1 TABLET DAILY TABLET ORAL DAILY
Refills: 0 | Status: ACTIVE | COMMUNITY

## 2024-11-05 RX ORDER — KRILL/OM-3/DHA/EPA/PHOSPHO/AST 1000-230MG
1 TABLET CAPSULE ORAL ONCE A DAY
Status: ACTIVE | COMMUNITY

## 2024-11-05 RX ORDER — CYCLOBENZAPRINE HYDROCHLORIDE 10 MG/1
TK 1 T PO TID TABLET, FILM COATED ORAL
Qty: 90 | Refills: 0 | Status: ON HOLD | COMMUNITY
Start: 2018-11-13

## 2024-11-05 RX ORDER — ADALIMUMAB 40MG/0.4ML
MAINTENANCE DOSE: ONE 40 MG SQ EVERY OTHER WEEK KIT SUBCUTANEOUS
Qty: 2 | Refills: 6 | Status: ON HOLD | COMMUNITY
Start: 2019-02-06

## 2024-11-05 RX ORDER — AMOXICILLIN 500 MG/1
CAPSULE ORAL
Qty: 21 | Refills: 0 | Status: ON HOLD | COMMUNITY
Start: 2019-05-02

## 2024-11-05 NOTE — HPI-TODAY'S VISIT:
38-year-old female with a history of Crohn's colitis status post total proctocolectomy with J-pouch formation with subsequent reanastomosis, complicated by fistula and cuff-itis presents for long interval follow-up.   She was last seen in 2020. She was doing well on Humira 40 mg every other week.  Her rectal/pelvic pain had improved with gabapentin 600 mg at bedtime and use of a heating pad.  She had undergone CT imaging for evaluation of possible hernia which demonstrated gastric thickening suggestive of gastritis and hepatic steatosis.  She reported upper abdominal pain after meals/nausea with improvement following resumption of pantoprazole.  She was recommended labs and an EGD.  Unfortunately she was out of network and would be self-pay.  She was provided recommendations for other gastroenterologists in the area in network with her insurance.  She had been followed by Dr. Torres in the interim until she moved to [place]. She was last followed by Dr. Curtis Brown in [place]. She had been on Humira weekly prior to Dr. Brown switching her to Stelara in July 2023. Her last pouchoscopy in July 2023 showed active inflammation and ulcers at the suture line and around. 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 She was recommended a repeat pouchoscopy in 6 months to assess response to Stelara. Otherwise, her colorectal surgeon recommends yearly pouchoscopy for surveillance. 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 She does feel better on Stelara compared to Humira. Her next Stelara dose is due around 2/5. She obtains this through [facility]. She has not had any BRBPR. Her chronic pelvic pain has improved, She is still averaging 6-12 stools per day. The consistency ranges from loose to formed. Denies abdominal pain typically. SHe may have a pinching sensation behind her prior ileostomy site. She attributes this to adhesions. She is nauseous most of the time. Denies vomiting. This preceded even her Crohn's diagnosis. SHe utilizes Zofran 8 mg about once every other week. She needs a refill.   She had a stroke prior to her proctocolectomy. She had 12 infarcts total noted on her brain MRI. Workup was negative for hematologic or cardiac etiologies.  It was determined her marked systemic inflammation caused a hypercoagulable state. She has not had any CVAs since then.  Interval history, 4/29/2024.  Pouchoscopy performed February 6 of this month revealed 2 ulcers in the ileal reservoir.  A small area of granular mucosa in the ileoanal pouch. Referring records were reviewed.  MRI performed June 6, 2023 revealed inflammatory change and scarring of the J-pouch.  Labs performed in January showed normal CBC, CMP, CRP. Stelara levels and ab levels are normal.  She was not able to get her Stelara for 3 months due to insurance and pharmacy issues. She believes she is now flaring. She has 15+ bowel movements a day and severe rectal pain and pressure. She denies fevers, chills or abdominal pain. Interval history, 11/5/2024. Laboratory testing dated September 11 of this year revealed a negative TB test.

## 2024-11-06 ENCOUNTER — TELEPHONE ENCOUNTER (OUTPATIENT)
Dept: URBAN - METROPOLITAN AREA CLINIC 113 | Facility: CLINIC | Age: 39
End: 2024-11-06

## 2024-12-11 ENCOUNTER — OFFICE VISIT (OUTPATIENT)
Dept: URBAN - METROPOLITAN AREA CLINIC 113 | Facility: CLINIC | Age: 39
End: 2024-12-11